# Patient Record
Sex: MALE | Race: BLACK OR AFRICAN AMERICAN | NOT HISPANIC OR LATINO | Employment: UNEMPLOYED | URBAN - METROPOLITAN AREA
[De-identification: names, ages, dates, MRNs, and addresses within clinical notes are randomized per-mention and may not be internally consistent; named-entity substitution may affect disease eponyms.]

---

## 2022-08-05 ENCOUNTER — OFFICE VISIT (OUTPATIENT)
Dept: PRIMARY CARE CLINIC | Facility: CLINIC | Age: 27
End: 2022-08-05
Payer: MEDICAID

## 2022-08-05 VITALS — HEART RATE: 90 BPM | TEMPERATURE: 98 F | OXYGEN SATURATION: 99 %

## 2022-08-05 DIAGNOSIS — Z86.39 HISTORY OF METABOLIC AND NUTRITIONAL DISORDER: ICD-10-CM

## 2022-08-05 DIAGNOSIS — Z00.00 GENERAL MEDICAL EXAM: ICD-10-CM

## 2022-08-05 DIAGNOSIS — G80.1: Primary | ICD-10-CM

## 2022-08-05 DIAGNOSIS — Z13.6 ENCOUNTER FOR LIPID SCREENING FOR CARDIOVASCULAR DISEASE: ICD-10-CM

## 2022-08-05 DIAGNOSIS — M24.569 CONTRACTURE OF JOINT OF LOWER LEG: ICD-10-CM

## 2022-08-05 DIAGNOSIS — Z13.220 ENCOUNTER FOR LIPID SCREENING FOR CARDIOVASCULAR DISEASE: ICD-10-CM

## 2022-08-05 DIAGNOSIS — Q93.82 WILLIAM SYNDROME: ICD-10-CM

## 2022-08-05 PROCEDURE — 99204 OFFICE O/P NEW MOD 45 MIN: CPT | Mod: S$PBB,,, | Performed by: NURSE PRACTITIONER

## 2022-08-05 PROCEDURE — 1159F MED LIST DOCD IN RCRD: CPT | Mod: CPTII,,, | Performed by: NURSE PRACTITIONER

## 2022-08-05 PROCEDURE — 99213 OFFICE O/P EST LOW 20 MIN: CPT | Mod: PBBFAC,PN | Performed by: NURSE PRACTITIONER

## 2022-08-05 PROCEDURE — 99999 PR PBB SHADOW E&M-EST. PATIENT-LVL III: CPT | Mod: PBBFAC,,, | Performed by: NURSE PRACTITIONER

## 2022-08-05 PROCEDURE — 99204 PR OFFICE/OUTPT VISIT, NEW, LEVL IV, 45-59 MIN: ICD-10-PCS | Mod: S$PBB,,, | Performed by: NURSE PRACTITIONER

## 2022-08-05 PROCEDURE — 99999 PR PBB SHADOW E&M-EST. PATIENT-LVL III: ICD-10-PCS | Mod: PBBFAC,,, | Performed by: NURSE PRACTITIONER

## 2022-08-05 PROCEDURE — 1159F PR MEDICATION LIST DOCUMENTED IN MEDICAL RECORD: ICD-10-PCS | Mod: CPTII,,, | Performed by: NURSE PRACTITIONER

## 2022-08-05 RX ORDER — NEOMYCIN SULFATE, POLYMYXIN B SULFATE, HYDROCORTISONE 3.5; 10000; 1 MG/ML; [USP'U]/ML; MG/ML
3 SOLUTION/ DROPS AURICULAR (OTIC)
COMMUNITY
Start: 2022-02-09

## 2022-08-05 RX ORDER — MEDICAL SUPPLY, MISCELLANEOUS
MISCELLANEOUS MISCELLANEOUS
COMMUNITY
Start: 2022-05-12

## 2022-08-05 RX ORDER — MUPIROCIN 20 MG/G
OINTMENT TOPICAL 3 TIMES DAILY
COMMUNITY
Start: 2022-06-06

## 2022-08-05 RX ORDER — LIDOCAINE 50 MG/G
OINTMENT TOPICAL
COMMUNITY
Start: 2022-02-09

## 2022-08-05 RX ORDER — NEOMYCIN SULFATE, POLYMYXIN B SULFATE, HYDROCORTISONE 3.5; 10000; 1 MG/ML; [USP'U]/ML; MG/ML
SOLUTION/ DROPS AURICULAR (OTIC)
COMMUNITY
Start: 2022-02-09

## 2022-08-05 RX ORDER — HYDROCORTISONE 25 MG/ML
LOTION TOPICAL 2 TIMES DAILY
COMMUNITY
Start: 2022-02-09 | End: 2023-02-09

## 2022-08-05 RX ORDER — HYDROCORTISONE 25 MG/ML
1 LOTION TOPICAL 2 TIMES DAILY
COMMUNITY
Start: 2022-05-04 | End: 2023-06-22 | Stop reason: SDUPTHER
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
## 2022-08-08 ENCOUNTER — PATIENT OUTREACH (OUTPATIENT)
Dept: ADMINISTRATIVE | Facility: OTHER | Age: 27
End: 2022-08-08
Payer: MEDICAID

## 2022-08-08 NOTE — PROGRESS NOTES
CHW - Initial Contact    This Community Health Worker completed OR updated the Social Determinant of Health questionnaire with [id number] via telephone today.    Pt identified barriers of most importance are Please see SDOH icons.  Referrals to community agencies completed with caregiver (Kacy Doyle) consent outside of [facility] include: SNAP and DME for a wheelchair.  Referrals were put through [facility] - No  Support and Services: SNAP and Wheelchair.  Other information discussed the patient needs help with mom (Kacy Yanira) none at this time.   Follow up required: Yes  Follow-up Outreach - Due: 8/22/2022

## 2022-08-09 ENCOUNTER — CLINICAL SUPPORT (OUTPATIENT)
Dept: REHABILITATION | Facility: HOSPITAL | Age: 27
End: 2022-08-09
Payer: MEDICAID

## 2022-08-09 DIAGNOSIS — G82.50 SPASTIC QUADRIPLEGIA: ICD-10-CM

## 2022-08-09 DIAGNOSIS — M24.569 CONTRACTURE OF JOINT OF LOWER LEG: ICD-10-CM

## 2022-08-09 PROCEDURE — 97161 PT EVAL LOW COMPLEX 20 MIN: CPT

## 2022-08-11 PROBLEM — G82.50 SPASTIC QUADRIPLEGIA: Status: ACTIVE | Noted: 2022-08-11
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
## 2022-08-30 ENCOUNTER — PATIENT OUTREACH (OUTPATIENT)
Dept: ADMINISTRATIVE | Facility: OTHER | Age: 27
End: 2022-08-30
Payer: MEDICAID

## 2022-08-30 NOTE — PROGRESS NOTES
CHW - Case Closure    This Community Health Worker spoke to caregiver (Drea) via telephone today.   Caregiver reported: She did not fill out for SNAP yet. And she will reach out to the  To get a referral to ortho.  Caregiver denied any additional needs at this time and agrees with episode closure at this time.  Provided caregiver with Community Health Worker's contact information and encouraged her to contact this Community Health Worker if additional needs arise.

## 2022-09-28 ENCOUNTER — TELEPHONE (OUTPATIENT)
Dept: PRIMARY CARE CLINIC | Facility: CLINIC | Age: 27
End: 2022-09-28
Payer: MEDICAID

## 2022-09-28 NOTE — TELEPHONE ENCOUNTER
----- Message from Cammy Enciso sent at 9/28/2022 12:56 PM CDT -----  Contact: Kina / mother [phone]  Patient would like to get a referral.  Referral to what specialty:  Orthopedics Doctor  Does the patient want the referral with a specific physician:  no  [facility]  Reason (be specific):  cerebral palsy, can not get into a seated position at all  Does the patient already have the specialty clinic appointment scheduled:  no  If yes, what date is the appointment scheduled:     Is the insurance listed in Epic correct? (this is important for a referral):  yes  Advised patient that once provider approves this either a nurse or  will return their call?:   Would the patient like a call back, or a response through their [facility] portal?:   Call back  Comments:     Please call and advise.    Thank You

## 2022-10-03 ENCOUNTER — TELEPHONE (OUTPATIENT)
Dept: PRIMARY CARE CLINIC | Facility: CLINIC | Age: 27
End: 2022-10-03
Payer: MEDICAID

## 2022-10-03 DIAGNOSIS — M24.569 CONTRACTURE OF JOINT OF LOWER LEG: ICD-10-CM

## 2022-10-03 DIAGNOSIS — G80.1: Primary | ICD-10-CM

## 2022-10-03 DIAGNOSIS — Q93.82 WILLIAM SYNDROME: ICD-10-CM

## 2022-10-03 NOTE — TELEPHONE ENCOUNTER
Spoke with Mother, Belinda regarding Ortho referral informed mother that referral has been placed and ready for scheduling Mother voiced understanding

## 2022-10-03 NOTE — TELEPHONE ENCOUNTER
Returned call to Drea, appointment scheduled. She voiced understanding of appointment date time and location.

## 2022-12-19 ENCOUNTER — LAB VISIT (OUTPATIENT)
Dept: LAB | Facility: HOSPITAL | Age: 27
End: 2022-12-19
Attending: NURSE PRACTITIONER
Payer: MEDICAID

## 2022-12-19 ENCOUNTER — OFFICE VISIT (OUTPATIENT)
Dept: PRIMARY CARE CLINIC | Facility: CLINIC | Age: 27
End: 2022-12-19
Payer: MEDICAID

## 2022-12-19 VITALS
HEART RATE: 74 BPM | SYSTOLIC BLOOD PRESSURE: 102 MMHG | DIASTOLIC BLOOD PRESSURE: 72 MMHG | WEIGHT: 45 LBS | OXYGEN SATURATION: 97 % | TEMPERATURE: 97 F

## 2022-12-19 DIAGNOSIS — G80.1: Primary | ICD-10-CM

## 2022-12-19 DIAGNOSIS — E03.9 HYPOTHYROIDISM, UNSPECIFIED TYPE: ICD-10-CM

## 2022-12-19 DIAGNOSIS — Z13.6 ENCOUNTER FOR LIPID SCREENING FOR CARDIOVASCULAR DISEASE: ICD-10-CM

## 2022-12-19 DIAGNOSIS — Z97.8 USES FEEDING TUBE: ICD-10-CM

## 2022-12-19 DIAGNOSIS — Z13.220 ENCOUNTER FOR LIPID SCREENING FOR CARDIOVASCULAR DISEASE: ICD-10-CM

## 2022-12-19 DIAGNOSIS — M25.559 HIP PAIN: ICD-10-CM

## 2022-12-19 DIAGNOSIS — Z00.00 GENERAL MEDICAL EXAM: ICD-10-CM

## 2022-12-19 DIAGNOSIS — Q93.82 WILLIAM SYNDROME: ICD-10-CM

## 2022-12-19 LAB
ALBUMIN SERPL BCP-MCNC: 4.4 G/DL (ref 3.5–5.2)
ALP SERPL-CCNC: 88 U/L (ref 55–135)
ALT SERPL W/O P-5'-P-CCNC: 29 U/L (ref 10–44)
ANION GAP SERPL CALC-SCNC: 14 MMOL/L (ref 8–16)
AST SERPL-CCNC: 35 U/L (ref 10–40)
BASOPHILS # BLD AUTO: 0.04 K/UL (ref 0–0.2)
BASOPHILS NFR BLD: 0.7 % (ref 0–1.9)
BILIRUB SERPL-MCNC: 1.1 MG/DL (ref 0.1–1)
BUN SERPL-MCNC: 16 MG/DL (ref 6–20)
CALCIUM SERPL-MCNC: 9.9 MG/DL (ref 8.7–10.5)
CHLORIDE SERPL-SCNC: 101 MMOL/L (ref 95–110)
CHOLEST SERPL-MCNC: 140 MG/DL (ref 120–199)
CHOLEST/HDLC SERPL: 2.2 {RATIO} (ref 2–5)
CO2 SERPL-SCNC: 24 MMOL/L (ref 23–29)
CREAT SERPL-MCNC: 0.6 MG/DL (ref 0.5–1.4)
DIFFERENTIAL METHOD: ABNORMAL
EOSINOPHIL # BLD AUTO: 0.5 K/UL (ref 0–0.5)
EOSINOPHIL NFR BLD: 7.3 % (ref 0–8)
ERYTHROCYTE [DISTWIDTH] IN BLOOD BY AUTOMATED COUNT: 12.8 % (ref 11.5–14.5)
EST. GFR  (NO RACE VARIABLE): >60 ML/MIN/1.73 M^2
GLUCOSE SERPL-MCNC: 87 MG/DL (ref 70–110)
HCT VFR BLD AUTO: 50.8 % (ref 40–54)
HDLC SERPL-MCNC: 64 MG/DL (ref 40–75)
HDLC SERPL: 45.7 % (ref 20–50)
HGB BLD-MCNC: 15.4 G/DL (ref 14–18)
IMM GRANULOCYTES # BLD AUTO: 0 K/UL (ref 0–0.04)
IMM GRANULOCYTES NFR BLD AUTO: 0 % (ref 0–0.5)
LDLC SERPL CALC-MCNC: 67 MG/DL (ref 63–159)
LYMPHOCYTES # BLD AUTO: 2.9 K/UL (ref 1–4.8)
LYMPHOCYTES NFR BLD: 48 % (ref 18–48)
MCH RBC QN AUTO: 27.7 PG (ref 27–31)
MCHC RBC AUTO-ENTMCNC: 30.3 G/DL (ref 32–36)
MCV RBC AUTO: 92 FL (ref 82–98)
MONOCYTES # BLD AUTO: 0.9 K/UL (ref 0.3–1)
MONOCYTES NFR BLD: 14.4 % (ref 4–15)
NEUTROPHILS # BLD AUTO: 1.8 K/UL (ref 1.8–7.7)
NEUTROPHILS NFR BLD: 29.6 % (ref 38–73)
NONHDLC SERPL-MCNC: 76 MG/DL
NRBC BLD-RTO: 0 /100 WBC
PLATELET # BLD AUTO: 200 K/UL (ref 150–450)
PMV BLD AUTO: 13.8 FL (ref 9.2–12.9)
POTASSIUM SERPL-SCNC: 4.4 MMOL/L (ref 3.5–5.1)
PROT SERPL-MCNC: 8.8 G/DL (ref 6–8.4)
RBC # BLD AUTO: 5.55 M/UL (ref 4.6–6.2)
SODIUM SERPL-SCNC: 139 MMOL/L (ref 136–145)
T3 SERPL-MCNC: 93 NG/DL (ref 60–180)
T4 FREE SERPL-MCNC: 1.01 NG/DL (ref 0.71–1.51)
TRIGL SERPL-MCNC: 45 MG/DL (ref 30–150)
TSH SERPL DL<=0.005 MIU/L-ACNC: 1.74 UIU/ML (ref 0.4–4)
WBC # BLD AUTO: 6.13 K/UL (ref 3.9–12.7)

## 2022-12-19 PROCEDURE — 86376 MICROSOMAL ANTIBODY EACH: CPT | Performed by: NURSE PRACTITIONER

## 2022-12-19 PROCEDURE — 80053 COMPREHEN METABOLIC PANEL: CPT | Performed by: NURSE PRACTITIONER

## 2022-12-19 PROCEDURE — 99999 PR PBB SHADOW E&M-EST. PATIENT-LVL IV: CPT | Mod: PBBFAC,,, | Performed by: NURSE PRACTITIONER

## 2022-12-19 PROCEDURE — 84439 ASSAY OF FREE THYROXINE: CPT | Performed by: NURSE PRACTITIONER

## 2022-12-19 PROCEDURE — 99214 OFFICE O/P EST MOD 30 MIN: CPT | Mod: PBBFAC,PN | Performed by: NURSE PRACTITIONER

## 2022-12-19 PROCEDURE — 3074F PR MOST RECENT SYSTOLIC BLOOD PRESSURE < 130 MM HG: ICD-10-PCS | Mod: CPTII,,, | Performed by: NURSE PRACTITIONER

## 2022-12-19 PROCEDURE — 3078F DIAST BP <80 MM HG: CPT | Mod: CPTII,,, | Performed by: NURSE PRACTITIONER

## 2022-12-19 PROCEDURE — 1159F MED LIST DOCD IN RCRD: CPT | Mod: CPTII,,, | Performed by: NURSE PRACTITIONER

## 2022-12-19 PROCEDURE — 3074F SYST BP LT 130 MM HG: CPT | Mod: CPTII,,, | Performed by: NURSE PRACTITIONER

## 2022-12-19 PROCEDURE — 85025 COMPLETE CBC W/AUTO DIFF WBC: CPT | Performed by: NURSE PRACTITIONER

## 2022-12-19 PROCEDURE — 99999 PR PBB SHADOW E&M-EST. PATIENT-LVL IV: ICD-10-PCS | Mod: PBBFAC,,, | Performed by: NURSE PRACTITIONER

## 2022-12-19 PROCEDURE — 84443 ASSAY THYROID STIM HORMONE: CPT | Performed by: NURSE PRACTITIONER

## 2022-12-19 PROCEDURE — 36415 COLL VENOUS BLD VENIPUNCTURE: CPT | Mod: PN | Performed by: NURSE PRACTITIONER

## 2022-12-19 PROCEDURE — 84480 ASSAY TRIIODOTHYRONINE (T3): CPT | Performed by: NURSE PRACTITIONER

## 2022-12-19 PROCEDURE — 99214 OFFICE O/P EST MOD 30 MIN: CPT | Mod: S$PBB,,, | Performed by: NURSE PRACTITIONER

## 2022-12-19 PROCEDURE — 80061 LIPID PANEL: CPT | Performed by: NURSE PRACTITIONER

## 2022-12-19 PROCEDURE — 1159F PR MEDICATION LIST DOCUMENTED IN MEDICAL RECORD: ICD-10-PCS | Mod: CPTII,,, | Performed by: NURSE PRACTITIONER

## 2022-12-19 PROCEDURE — 3078F PR MOST RECENT DIASTOLIC BLOOD PRESSURE < 80 MM HG: ICD-10-PCS | Mod: CPTII,,, | Performed by: NURSE PRACTITIONER

## 2022-12-19 PROCEDURE — 99214 PR OFFICE/OUTPT VISIT, EST, LEVL IV, 30-39 MIN: ICD-10-PCS | Mod: S$PBB,,, | Performed by: NURSE PRACTITIONER

## 2022-12-19 NOTE — PROGRESS NOTES
Subjective:       Patient ID: Stas Munoz is a 27 y.o. male.    Chief Complaint: Follow-up (Referral Ortho )      History of Present Illness:   Stas Munoz 27 y.o. male presents today for follow up bilateral hip pain and contracture. According to mom patient has had several surgeries to try to correct issue but she still can not sit him up in a wheelchair. Patient has to be carried around by younger brother. Mom reports that no one has reached out to her since previous visit for appointment. Will resubmit the referral. Treatment options and alternatives were discussed with the patient. Patient provided opportunity to ask additional questions.  All questions were answered. Voices understanding and acceptance of this advice. Instructed to call back if any further questions or concerns.      Social History     Socioeconomic History    Marital status: Single   Tobacco Use    Smoking status: Never    Smokeless tobacco: Never   Substance and Sexual Activity    Alcohol use: Never    Drug use: Never    Sexual activity: Never     Social Determinants of Health     Financial Resource Strain: Low Risk     Difficulty of Paying Living Expenses: Not hard at all   Food Insecurity: No Food Insecurity    Worried About Running Out of Food in the Last Year: Never true    Ran Out of Food in the Last Year: Never true   Transportation Needs: No Transportation Needs    Lack of Transportation (Medical): No    Lack of Transportation (Non-Medical): No   Physical Activity: Inactive    Days of Exercise per Week: 0 days    Minutes of Exercise per Session: 0 min   Stress: No Stress Concern Present    Feeling of Stress : Not at all   Social Connections: Unknown    Frequency of Communication with Friends and Family: More than three times a week    Frequency of Social Gatherings with Friends and Family: More than three times a week    Active Member of Clubs or Organizations: No    Attends Club or Organization Meetings: Never    Marital Status:  Never    Housing Stability: High Risk    Unable to Pay for Housing in the Last Year: Yes    Number of Places Lived in the Last Year: 1    Unstable Housing in the Last Year: No     Outpatient Encounter Medications as of 12/19/2022   Medication Sig Dispense Refill    hydrocortisone 2.5 % lotion Apply topically 2 (two) times daily.      hydrocortisone 2.5 % lotion 1 application 2 (two) times daily. Apply to affected area      LIDOcaine (XYLOCAINE) 5 % Oint ointment Apply topically.      miscellaneous medical supply (C-TUB) [facility] [place] Fax [phone] Provide Zackery Button size 1.2 cm length. One button to be changed every 6 months. Attention: SULMA.      mupirocin (BACTROBAN) 2 % ointment Apply topically 3 (three) times daily.      neomycin-polymyxin-hydrocortisone (CORTISPORIN) otic solution SMARTSIG:Left Ear      neomycin-polymyxin-hydrocortisone (CORTISPORIN) otic solution Place 3 drops in ear(s).       No facility-administered encounter medications on file as of 12/19/2022.       Review of Systems   Constitutional:  Negative for appetite change, chills and fever.   HENT:  Negative for ear pain, sinus pressure, sore throat and trouble swallowing.    Eyes:  Negative for visual disturbance.   Respiratory:  Negative for shortness of breath.    Cardiovascular:  Negative for chest pain.   Gastrointestinal:  Negative for abdominal pain, diarrhea, nausea and vomiting.   Endocrine: Negative for cold intolerance, polyphagia and polyuria.   Genitourinary:  Negative for decreased urine volume and dysuria.   Musculoskeletal:  Positive for arthralgias (spastic). Negative for back pain.   Skin:  Negative for rash.   Allergic/Immunologic: Negative for environmental allergies and food allergies.   Neurological:  Negative for dizziness, tremors, weakness and numbness.   Hematological:  Does not bruise/bleed easily.   Psychiatric/Behavioral:  Negative for confusion and hallucinations. The patient is not nervous/anxious and is not  hyperactive.    All other systems reviewed and are negative.    Objective:      /72 (BP Location: Left arm, Patient Position: Sitting, BP Method: Medium (Manual))   Pulse 74   Temp 97 °F (36.1 °C) (Temporal)   Wt 20.4 kg (45 lb)   SpO2 97%   Physical Exam  Constitutional:       General: He is not in acute distress.     Appearance: He is not ill-appearing, toxic-appearing or diaphoretic.   Pulmonary:      Effort: Pulmonary effort is normal.      Breath sounds: Normal breath sounds.   Abdominal:      General: Bowel sounds are normal.      Palpations: Abdomen is soft.   Musculoskeletal:      Right hip: Decreased range of motion. Decreased strength.      Left hip: Decreased range of motion. Decreased strength.      Right upper leg: Deformity present.      Left upper leg: Deformity present.       No results found for this or any previous visit.  Assessment:       1. Spastic cerebral palsy, congenital    2. Jhonathan syndrome    3. Hypothyroidism, unspecified type    4. General medical exam          Plan:   Stas was seen today for follow-up.    Diagnoses and all orders for this visit:    Spastic cerebral palsy, congenital  -     Ambulatory referral/consult to Orthopedics; Future    Jhonathan syndrome    Hypothyroidism, unspecified type  -     TSH; Future  -     T3; Future  -     T4, Free; Future  -     Thyroid Peroxidase Antibody; Future    General medical exam  -     CBC Auto Differential; Future  -     Comprehensive Metabolic Panel; Future    Hip pain  -     Ambulatory referral/consult to Neurology; Future    Encounter for lipid screening for cardiovascular disease  -     Lipid Panel; Future    Uses feeding tube  -     Ambulatory referral/consult to Gastroenterology; Future              [facility]   [address] [place] [address] [address]  [place] [address]  Office [phone]  Fax [phone]

## 2022-12-20 LAB — THYROPEROXIDASE IGG SERPL-ACNC: <6 IU/ML

## 2022-12-21 ENCOUNTER — TELEPHONE (OUTPATIENT)
Dept: PRIMARY CARE CLINIC | Facility: CLINIC | Age: 27
End: 2022-12-21
Payer: MEDICAID

## 2022-12-21 NOTE — TELEPHONE ENCOUNTER
Patient mother called regarding patient labs. Patient mother informed of results. Patient mother voiced understanding.  ----- Message from César Paulino DNP sent at 12/21/2022 12:37 PM CST -----  Please contact the patient and let them know that their results were fine and do not require any change in treatment.

## 2023-01-23 ENCOUNTER — TELEPHONE (OUTPATIENT)
Dept: PRIMARY CARE CLINIC | Facility: CLINIC | Age: 28
End: 2023-01-23
Payer: MEDICAID

## 2023-01-23 NOTE — TELEPHONE ENCOUNTER
I returned the patient's call in reference to his referral for Ortho, the Order is in and ready for scheduling, patient was also informed to contact the Ortho for scheduling. Patient Mother Drea voiced her understanding and was given the scheduling information.

## 2023-06-16 ENCOUNTER — TELEPHONE (OUTPATIENT)
Dept: PRIMARY CARE CLINIC | Facility: CLINIC | Age: 28
End: 2023-06-16
Payer: MEDICAID

## 2023-06-16 NOTE — TELEPHONE ENCOUNTER
Patient called regarding request for 90 L form completion spoke with Kacy, mother informed her that patient is in need of follow up with labs, educated patient on form completion and turn around time, advised patient to contact insurance provider and get information on whose in network and get the names and contact information of providers. Patient given appt date and time

## 2023-06-22 ENCOUNTER — OFFICE VISIT (OUTPATIENT)
Dept: PRIMARY CARE CLINIC | Facility: CLINIC | Age: 28
End: 2023-06-22
Payer: MEDICAID

## 2023-06-22 ENCOUNTER — LAB VISIT (OUTPATIENT)
Dept: LAB | Facility: HOSPITAL | Age: 28
End: 2023-06-22
Attending: FAMILY MEDICINE
Payer: MEDICAID

## 2023-06-22 ENCOUNTER — PATIENT OUTREACH (OUTPATIENT)
Dept: ADMINISTRATIVE | Facility: OTHER | Age: 28
End: 2023-06-22
Payer: MEDICAID

## 2023-06-22 DIAGNOSIS — E03.9 HYPOTHYROIDISM, UNSPECIFIED TYPE: ICD-10-CM

## 2023-06-22 DIAGNOSIS — Z97.8 USES FEEDING TUBE: ICD-10-CM

## 2023-06-22 DIAGNOSIS — R47.01 NONVERBAL: ICD-10-CM

## 2023-06-22 DIAGNOSIS — M24.569 CONTRACTURE OF JOINT OF LOWER LEG: ICD-10-CM

## 2023-06-22 DIAGNOSIS — Z86.39 HISTORY OF METABOLIC AND NUTRITIONAL DISORDER: ICD-10-CM

## 2023-06-22 DIAGNOSIS — G82.50 SPASTIC QUADRIPLEGIA: Primary | ICD-10-CM

## 2023-06-22 DIAGNOSIS — G80.1: ICD-10-CM

## 2023-06-22 DIAGNOSIS — L20.89 OTHER ATOPIC DERMATITIS: ICD-10-CM

## 2023-06-22 DIAGNOSIS — K21.9 GASTROESOPHAGEAL REFLUX DISEASE WITHOUT ESOPHAGITIS: ICD-10-CM

## 2023-06-22 DIAGNOSIS — J30.9 CHRONIC ALLERGIC RHINITIS: ICD-10-CM

## 2023-06-22 DIAGNOSIS — Q93.82 WILLIAM SYNDROME: ICD-10-CM

## 2023-06-22 LAB — TSH SERPL DL<=0.005 MIU/L-ACNC: 2.44 UIU/ML (ref 0.4–4)

## 2023-06-22 PROCEDURE — 99999 PR PBB SHADOW E&M-EST. PATIENT-LVL IV: ICD-10-PCS | Mod: PBBFAC,,, | Performed by: FAMILY MEDICINE

## 2023-06-22 PROCEDURE — 99215 PR OFFICE/OUTPT VISIT, EST, LEVL V, 40-54 MIN: ICD-10-PCS | Mod: S$PBB,,, | Performed by: FAMILY MEDICINE

## 2023-06-22 PROCEDURE — 99215 OFFICE O/P EST HI 40 MIN: CPT | Mod: S$PBB,,, | Performed by: FAMILY MEDICINE

## 2023-06-22 PROCEDURE — 1159F PR MEDICATION LIST DOCUMENTED IN MEDICAL RECORD: ICD-10-PCS | Mod: CPTII,,, | Performed by: FAMILY MEDICINE

## 2023-06-22 PROCEDURE — 84443 ASSAY THYROID STIM HORMONE: CPT | Performed by: FAMILY MEDICINE

## 2023-06-22 PROCEDURE — 36415 COLL VENOUS BLD VENIPUNCTURE: CPT | Mod: PN | Performed by: FAMILY MEDICINE

## 2023-06-22 PROCEDURE — 1159F MED LIST DOCD IN RCRD: CPT | Mod: CPTII,,, | Performed by: FAMILY MEDICINE

## 2023-06-22 PROCEDURE — 99214 OFFICE O/P EST MOD 30 MIN: CPT | Mod: PBBFAC,PN | Performed by: FAMILY MEDICINE

## 2023-06-22 PROCEDURE — 99999 PR PBB SHADOW E&M-EST. PATIENT-LVL IV: CPT | Mod: PBBFAC,,, | Performed by: FAMILY MEDICINE

## 2023-06-22 RX ORDER — BACLOFEN 5 MG/ML
10 SUSPENSION ORAL EVERY 8 HOURS PRN
Qty: 300 ML | Refills: 2 | Status: SHIPPED | OUTPATIENT
Start: 2023-06-22 | End: 2023-06-29

## 2023-06-22 RX ORDER — FAMOTIDINE 40 MG/5ML
20 POWDER, FOR SUSPENSION ORAL 2 TIMES DAILY PRN
Qty: 50 ML | Refills: 2 | Status: SHIPPED | OUTPATIENT
Start: 2023-06-22 | End: 2023-07-22

## 2023-06-22 RX ORDER — CETIRIZINE HYDROCHLORIDE 1 MG/ML
10 SOLUTION ORAL DAILY
Qty: 473 ML | Refills: 5 | Status: SHIPPED | OUTPATIENT
Start: 2023-06-22 | End: 2024-06-21

## 2023-06-22 RX ORDER — HYDROCORTISONE 25 MG/ML
LOTION TOPICAL 2 TIMES DAILY
Qty: 118 ML | Refills: 11 | Status: SHIPPED | OUTPATIENT
Start: 2023-06-22 | End: 2023-07-22

## 2023-06-22 RX ORDER — TRIAMCINOLONE ACETONIDE 1 MG/G
CREAM TOPICAL 2 TIMES DAILY PRN
Qty: 453.6 G | Refills: 0 | Status: SHIPPED | OUTPATIENT
Start: 2023-06-22 | End: 2024-06-21

## 2023-06-22 NOTE — PROGRESS NOTES
Subjective:       Patient ID: Stas Munoz is a 27 y.o. male.    Chief Complaint: Annual Exam      History of Present Illness:   Stas Munoz 27 y.o. male presents today with     Total care, brought in by his mother and aunt who takes car eof him during the day. In bed, writhing and the mother had to hold him down.   Requesting 90 L forms to be filled,  22. Spoke to  too.  Gravely disabled, with Spastic CP looking for contracture release to the knees so he is able to sit in a wheelchair. He has been waiting for years and getting too heavy for the mother who has to carry him around. Ortho referral has been done several times and the mother is sound ing frustrated today.  Epic shows hypothyroidism diagnosis, mother verified that he used to be on levothyroxine.  Asking for refill on topical steroid for eczema and zyrtec for his allergies.   History reviewed. No pertinent past medical history.  History reviewed. No pertinent family history.  Social History     Socioeconomic History    Marital status: Single   Tobacco Use    Smoking status: Never    Smokeless tobacco: Never   Substance and Sexual Activity    Alcohol use: Never    Drug use: Never    Sexual activity: Never     Social Determinants of Health     Financial Resource Strain: Low Risk     Difficulty of Paying Living Expenses: Not hard at all   Food Insecurity: No Food Insecurity    Worried About Running Out of Food in the Last Year: Never true    Ran Out of Food in the Last Year: Never true   Transportation Needs: No Transportation Needs    Lack of Transportation (Medical): No    Lack of Transportation (Non-Medical): No   Physical Activity: Inactive    Days of Exercise per Week: 0 days    Minutes of Exercise per Session: 0 min   Stress: No Stress Concern Present    Feeling of Stress : Not at all   Social Connections: Socially Isolated    Frequency of Communication with Friends and Family: More than three times a week    Frequency of  Social Gatherings with Friends and Family: More than three times a week    Attends Denominational Services: Never    Active Member of Clubs or Organizations: No    Attends Club or Organization Meetings: Never    Marital Status: Never    Housing Stability: Low Risk     Unable to Pay for Housing in the Last Year: No    Number of Places Lived in the Last Year: 1    Unstable Housing in the Last Year: No     Outpatient Encounter Medications as of 6/22/2023   Medication Sig Dispense Refill    LIDOcaine (XYLOCAINE) 5 % Oint ointment Apply topically.      misc[place] medical [place]UB[place] [facility] [phone] Provide Zackery Button size 1.2 cm length. One button to be changed every 6 months. Attention: SULMA.      mupirocin (BACTROBAN) 2 % ointment Apply topically 3 (three) times daily.      neomycin-polymyxin-hydrocortisone (CORTISPORIN) otic solution SMARTSIG:Left Ear      neomycin-polymyxin-hydrocortisone (CORTISPORIN) otic solution Place 3 drops in ear(s).      [DISCONTINUED] hydrocortisone 2.5 % lotion 1 application 2 (two) times daily. Apply to affected area      baclofen 25 mg/5 mL (5 mg/mL) Susp oral liquid Take 2 mLs (10 mg total) by mouth every 8 (eight) hours as needed (spasm). 300 mL 2    cetirizine (ZYRTEC) 1 mg/mL syrup Take 10 mLs (10 mg total) by mouth once daily. 473 mL 5    famotidine (PEPCID) 40 mg/5 mL (8 mg/mL) suspension Take 2.5 mLs (20 mg total) by mouth 2 (two) times daily as needed (acid reflux). 50 mL 2    hydrocortisone 2.5 % lotion Apply topically 2 (two) times daily. Apply to affected area 118 mL 11    triamcinolone acetonide 0.1% (KENALOG) 0.1 % cream Apply topically 2 (two) times daily as needed. 453.6 g 0     No facility-administered encounter medications on file as of 6/22/2023.       Review of Systems   Unable to perform ROS: Acuity of condition            Objective:      BP (P) 114/81 (BP Location: Left arm, Patient Position: Sitting, BP Method: Pediatric (Automatic))   Pulse (!)  (P) 111   Temp (P) 97.9 °F (36.6 °C) (Temporal)   Physical Exam  Constitutional:       Comments: Eyes open, writhing   Abdominal:      Tenderness: There is no abdominal tenderness.          Comments: LUQ peg tube in place   Skin:     Findings: Rash present. Rash is macular and papular.         Results for orders placed or performed in visit on 12/19/22   TSH   Result Value Ref Range    TSH 1.737 0.400 - 4.000 uIU/mL   T3   Result Value Ref Range    T3, Total 93 60 - 180 ng/dL   T4, Free   Result Value Ref Range    Free T4 1.01 0.71 - 1.51 ng/dL   Thyroid Peroxidase Antibody   Result Value Ref Range    Thyroperoxidase Antibodies <6.0 <6.0 IU/mL   CBC Auto Differential   Result Value Ref Range    WBC 6.13 3.90 - 12.70 K/uL    RBC 5.55 4.60 - 6.20 M/uL    Hemoglobin 15.4 14.0 - 18.0 g/dL    Hematocrit 50.8 40.0 - 54.0 %    MCV 92 82 - 98 fL    MCH 27.7 27.0 - 31.0 pg    MCHC 30.3 (L) 32.0 - 36.0 g/dL    RDW 12.8 11.5 - 14.5 %    Platelets 200 150 - 450 K/uL    MPV 13.8 (H) 9.2 - 12.9 fL    Immature Granulocytes 0.0 0.0 - 0.5 %    Gran # (ANC) 1.8 1.8 - 7.7 K/uL    Immature Grans (Abs) 0.00 0.00 - 0.04 K/uL    Lymph # 2.9 1.0 - 4.8 K/uL    Mono # 0.9 0.3 - 1.0 K/uL    Eos # 0.5 0.0 - 0.5 K/uL    Baso # 0.04 0.00 - 0.20 K/uL    nRBC 0 0 /100 WBC    Gran % 29.6 (L) 38.0 - 73.0 %    Lymph % 48.0 18.0 - 48.0 %    Mono % 14.4 4.0 - 15.0 %    Eosinophil % 7.3 0.0 - 8.0 %    Basophil % 0.7 0.0 - 1.9 %    Differential Method Automated    Comprehensive Metabolic Panel   Result Value Ref Range    Sodium 139 136 - 145 mmol/L    Potassium 4.4 3.5 - 5.1 mmol/L    Chloride 101 95 - 110 mmol/L    CO2 24 23 - 29 mmol/L    Glucose 87 70 - 110 mg/dL    BUN 16 6 - 20 mg/dL    Creatinine 0.6 0.5 - 1.4 mg/dL    Calcium 9.9 8.7 - 10.5 mg/dL    Total Protein 8.8 (H) 6.0 - 8.4 g/dL    Albumin 4.4 3.5 - 5.2 g/dL    Total Bilirubin 1.1 (H) 0.1 - 1.0 mg/dL    Alkaline Phosphatase 88 55 - 135 U/L    AST 35 10 - 40 U/L    ALT 29 10 - 44 U/L     Anion Gap 14 8 - 16 mmol/L    eGFR >60.0 >60 mL/min/1.73 m^2   Lipid Panel   Result Value Ref Range    Cholesterol 140 120 - 199 mg/dL    Triglycerides 45 30 - 150 mg/dL    HDL 64 40 - 75 mg/dL    LDL Cholesterol 67.0 63.0 - 159.0 mg/dL    HDL/Cholesterol Ratio 45.7 20.0 - 50.0 %    Total Cholesterol/HDL Ratio 2.2 2.0 - 5.0    Non-HDL Cholesterol 76 mg/dL     Assessment:       1. Spastic quadriplegia    2. Spastic cerebral palsy, congenital    3. Jhonathan syndrome    4. Uses feeding tube    5. History of metabolic and nutritional disorder    6. Contracture of joint of lower leg    7. Hypothyroidism, unspecified type    8. Other atopic dermatitis    9. Chronic allergic rhinitis    10. Gastroesophageal reflux disease without esophagitis    11. Nonverbal        Plan:   Spastic quadriplegia  -     Ambulatory referral/consult to Orthopedics; Future; Expected date: 06/29/2023  -     baclofen 25 mg/5 mL (5 mg/mL) Susp oral liquid; Take 2 mLs (10 mg total) by mouth every 8 (eight) hours as needed (spasm).  Dispense: 300 mL; Refill: 2  -     Ambulatory referral/consult to Home Health; Future; Expected date: 06/23/2023    Spastic cerebral palsy, congenital  -     Ambulatory referral/consult to Orthopedics; Future; Expected date: 06/29/2023  -     Ambulatory referral/consult to Home Health; Future; Expected date: 06/23/2023    Jhonathan syndrome    Uses feeding tube    History of metabolic and nutritional disorder    Contracture of joint of lower leg  -     Ambulatory referral/consult to Orthopedics; Future; Expected date: 06/29/2023    Hypothyroidism, unspecified type  -     TSH; Future; Expected date: 06/22/2023    Other atopic dermatitis  -     hydrocortisone 2.5 % lotion; Apply topically 2 (two) times daily. Apply to affected area  Dispense: 118 mL; Refill: 11  -     triamcinolone acetonide 0.1% (KENALOG) 0.1 % cream; Apply topically 2 (two) times daily as needed.  Dispense: 453.6 g; Refill: 0    Chronic allergic rhinitis  -      cetirizine (ZYRTEC) 1 mg/mL syrup; Take 10 mLs (10 mg total) by mouth once daily.  Dispense: 473 mL; Refill: 5    Gastroesophageal reflux disease without esophagitis  Comments:  presumed  Orders:  -     famotidine (PEPCID) 40 mg/5 mL (8 mg/mL) suspension; Take 2.5 mLs (20 mg total) by mouth 2 (two) times daily as needed (acid reflux).  Dispense: 50 mL; Refill: 2    Nonverbal      90L filled.  Spoke with his .      MEDICAL DECISION MAKING: Moderate to high complexity.  Overall, the multiple problems listed are of moderate to high severity that may impact quality of life and activities of daily living. Side effects of medications, treatment plan as well as options and alternatives reviewed and discussed with patient. There was counseling of patient concerning these issues.  Total time spent in face to face counseling and coordination of care  is 60 minutes   I have reviewed all of the patient's clinical history available in care everywhere and Epic and have utilized this in my evaluation and management recommendations today.     Treatment options and alternatives were discussed with the patient. Patient was given ample time to ask questions. All questions were answered. Voices understanding and acceptance of this advice. Will call back if any further questions or concerns.  Fela Armas MD

## 2023-06-22 NOTE — PROGRESS NOTES
CHW - Initial Contact    This Community Health Worker completed the Social Determinant of Health questionnaire with caregiver during clinic visit today.    Pt identified barriers of most importance are. Patient shared no barriers for resources.   Referrals to community agencies completed with patient consent outside of [facility] include. No outside referrals at this time.  Referrals were put through [facility] - no  Support and Services: None  Other information discussed the patient needs help with. No other information was shared.   Follow up required: Yes  Follow-up Outreach - Due: 6/29/2023

## 2023-06-27 ENCOUNTER — TELEPHONE (OUTPATIENT)
Dept: PRIMARY CARE CLINIC | Facility: CLINIC | Age: 28
End: 2023-06-27
Payer: MEDICAID

## 2023-06-27 ENCOUNTER — PATIENT MESSAGE (OUTPATIENT)
Dept: RESEARCH | Facility: HOSPITAL | Age: 28
End: 2023-06-27
Payer: MEDICAID

## 2023-06-27 DIAGNOSIS — G82.50 SPASTIC QUADRIPLEGIA: ICD-10-CM

## 2023-06-27 NOTE — TELEPHONE ENCOUNTER
Patient mother informed of results. Patient mother voiced understanding.  ----- Message from Fela Armas MD sent at 6/25/2023  9:02 PM CDT -----  Thyroid function is controlled

## 2023-06-28 ENCOUNTER — TELEPHONE (OUTPATIENT)
Dept: ORTHOPEDICS | Facility: CLINIC | Age: 28
End: 2023-06-28
Payer: MEDICAID

## 2023-06-29 RX ORDER — BACLOFEN 5 MG/ML
10 SUSPENSION ORAL EVERY 8 HOURS PRN
Qty: 300 ML | Refills: 2 | Status: SHIPPED | OUTPATIENT
Start: 2023-06-29 | End: 2023-09-27

## 2023-07-03 ENCOUNTER — HOSPITAL ENCOUNTER (OUTPATIENT)
Dept: RADIOLOGY | Facility: HOSPITAL | Age: 28
Discharge: HOME | End: 2023-07-03
Attending: ORTHOPAEDIC SURGERY
Payer: MEDICAID

## 2023-07-03 ENCOUNTER — OFFICE VISIT (OUTPATIENT)
Dept: ORTHOPEDIC SURGERY | Facility: CLINIC | Age: 28
End: 2023-07-03
Payer: MEDICAID

## 2023-07-03 DIAGNOSIS — G80.1: ICD-10-CM

## 2023-07-03 DIAGNOSIS — G82.50 SPASTIC QUADRIPLEGIA: ICD-10-CM

## 2023-07-03 DIAGNOSIS — M24.569 CONTRACTURE OF JOINT OF LOWER LEG: ICD-10-CM

## 2023-07-03 DIAGNOSIS — G82.50 SPASTIC QUADRIPLEGIA: Primary | ICD-10-CM

## 2023-07-03 PROCEDURE — 73560 X-RAY EXAM OF KNEE 1 OR 2: CPT | Mod: 26,RT,, | Performed by: RADIOLOGY

## 2023-07-03 PROCEDURE — 73560 X-RAY EXAM OF KNEE 1 OR 2: CPT | Mod: TC,RT

## 2023-07-03 PROCEDURE — 99999 PR PBB SHADOW E&M-EST. PATIENT-LVL III: CPT | Mod: PBBFAC,,, | Performed by: ORTHOPAEDIC SURGERY

## 2023-07-03 PROCEDURE — 72082 XR SCOLIOSIS COMPLETE: ICD-10-PCS | Mod: 26,,, | Performed by: RADIOLOGY

## 2023-07-03 PROCEDURE — 72190 XR PELVIS COMPLETE MIN 3 VIEWS: ICD-10-PCS | Mod: 26,,, | Performed by: RADIOLOGY

## 2023-07-03 PROCEDURE — 1159F PR MEDICATION LIST DOCUMENTED IN MEDICAL RECORD: ICD-10-PCS | Mod: CPTII,,, | Performed by: ORTHOPAEDIC SURGERY

## 2023-07-03 PROCEDURE — 72082 X-RAY EXAM ENTIRE SPI 2/3 VW: CPT | Mod: 26,,, | Performed by: RADIOLOGY

## 2023-07-03 PROCEDURE — 1160F PR REVIEW ALL MEDS BY PRESCRIBER/CLIN PHARMACIST DOCUMENTED: ICD-10-PCS | Mod: CPTII,,, | Performed by: ORTHOPAEDIC SURGERY

## 2023-07-03 PROCEDURE — 72190 X-RAY EXAM OF PELVIS: CPT | Mod: TC

## 2023-07-03 PROCEDURE — 72082 X-RAY EXAM ENTIRE SPI 2/3 VW: CPT | Mod: TC

## 2023-07-03 PROCEDURE — 1160F RVW MEDS BY RX/DR IN RCRD: CPT | Mod: CPTII,,, | Performed by: ORTHOPAEDIC SURGERY

## 2023-07-03 PROCEDURE — 99204 OFFICE O/P NEW MOD 45 MIN: CPT | Mod: S$PBB,,, | Performed by: ORTHOPAEDIC SURGERY

## 2023-07-03 PROCEDURE — 72190 X-RAY EXAM OF PELVIS: CPT | Mod: 26,,, | Performed by: RADIOLOGY

## 2023-07-03 PROCEDURE — 99204 PR OFFICE/OUTPT VISIT, NEW, LEVL IV, 45-59 MIN: ICD-10-PCS | Mod: S$PBB,,, | Performed by: ORTHOPAEDIC SURGERY

## 2023-07-03 PROCEDURE — 73560 XR KNEE 1 OR 2 VIEW RIGHT: ICD-10-PCS | Mod: 26,RT,, | Performed by: RADIOLOGY

## 2023-07-03 PROCEDURE — 99213 OFFICE O/P EST LOW 20 MIN: CPT | Mod: PBBFAC | Performed by: ORTHOPAEDIC SURGERY

## 2023-07-03 PROCEDURE — 1159F MED LIST DOCD IN RCRD: CPT | Mod: CPTII,,, | Performed by: ORTHOPAEDIC SURGERY

## 2023-07-03 PROCEDURE — 99999 PR PBB SHADOW E&M-EST. PATIENT-LVL III: ICD-10-PCS | Mod: PBBFAC,,, | Performed by: ORTHOPAEDIC SURGERY

## 2023-07-05 ENCOUNTER — PATIENT OUTREACH (OUTPATIENT)
Dept: ADMINISTRATIVE | Facility: OTHER | Age: 28
End: 2023-07-05
Payer: MEDICAID

## 2023-07-05 NOTE — PROGRESS NOTES
CHW - Follow Up    This Community Health Worker completed a follow up visit with patient via telephone today.  Pt reported no new information was reported at this time.  Community Health Worker provided patient with a follow up call to make sure he's doing well.  Follow up required: No  No future outreach task assigned      CHW - Case Closure    This Community Health Worker spoke to patient via telephone today.   Pt reported no new information att his time.  Pt denied any additional needs at this time and agrees with episode closure at this time.  Provided patient with Community Health Worker's contact information and encouraged her to contact this Community Health Worker if additional needs arise.

## 2023-07-05 NOTE — PROGRESS NOTES
[facility]  Pediatric Orthopedic Clinic      Patient ID:   NAME:  Stas Munoz   MRN:  [id number]  DOS:  7/3/2021       Reason for Appointment  Chief Complaint   Patient presents with    Consult       History of Present Illness  Stas is a 27 y.o. male presenting for an initial patient visit for evaluation of his bilateral lower extremity contractures. Mom's overall goal is to improve his seating position so that they may better use a wheelchair for transportation. He carries an underlying diagnosis of quadriplegic CP GMFCS 5. He has had multiple bilateral hip surgeries including bilateral proximal femoral resections. The left hip subsequently developed HO and cannot be moved at this time. He currently does not participate in PT/OT, he does not see physical medicine and rehabilitation, he does take baclofen for his tone management. They are otherwise without complaint today.    Review Of Systems  All systems were reviewed and are negative except as noted in the HPI    The following portions of the patient's history were reviewed and updated as appropriate: allergies, past family history, past medical history, past social history, past surgical history, and problem list.      Examination  There were no vitals filed for this visit.    Constitutional: Alert. Non-verbal, though will make noises  Musculoskeletal:    right lower extremity:  short arch ROM at the hip with mechanical block, minimal abduction present at the hip, knee flexion contracture of 100deg, unable to assess distal motor/sensory 2/2 underyling medical co-morbidities   Left lower extremity: no ROM at the hip, no abduction/adduction, knee flexion contracture of 40 degrees, baseline motor/sensory    Imaging  Radiographs reviewed by me in clinic today from an orthopedic perspective demonstrate post-surgical changes and spine fusion implants without obvious signs of failure or lucency. The left hip demonstrates significant  "heterotopic ossification around the acetabulum and proximal femur. 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 The right proximal femur is not present, there is significant HO around the right hip. The right knee does not demonstrate any acute osseous abnormalities.    Assessments/Plan  Stas is a 27 y.o. male with quad CP and significant lower extremity contractures preventing appropriate seating. I discussed his physical exam with his mother and discussed that I would like to obtain current imaging and depending on that imaging will determine if a CT of the pelvis is warranted. After review of those films a pelvis CT was ordered for preoperative planning. We will plan to see them back in 2 weeks for further treatment discussions.    Follow Up  2 weeks    Total time spent was at least 45 minutes which included obtaining the history of present illness, face-to-face examination, image review, review of previous clinical notes, counseling, and documenting in the medical chart.    Karan Hollins MD, MSc, FAAOS  Pediatric Orthopedic Surgeon, Dept of Orthopedics  [facility]  Phone:  [place]: [phone]  [place]: [phone]     *Portions of this note may have been created with voice recognition software. Occasional "wrong-word" or "sound-a-like" substitutions may have occurred due to the inherent limitations of voice recognition software.  Please, read the note carefully and recognize, using context, where substitutions have occurred.    "

## 2023-07-07 ENCOUNTER — TELEPHONE (OUTPATIENT)
Dept: ORTHOPEDIC SURGERY | Facility: CLINIC | Age: 28
End: 2023-07-07
Payer: MEDICAID

## 2023-08-14 ENCOUNTER — TELEPHONE (OUTPATIENT)
Dept: ORTHOPEDIC SURGERY | Facility: CLINIC | Age: 28
End: 2023-08-14
Payer: MEDICAID

## 2024-05-06 ENCOUNTER — OFFICE VISIT (OUTPATIENT)
Dept: PRIMARY CARE CLINIC | Facility: CLINIC | Age: 29
End: 2024-05-06
Payer: MEDICAID

## 2024-05-06 DIAGNOSIS — G82.50 SPASTIC QUADRIPLEGIA: Primary | ICD-10-CM

## 2024-05-06 DIAGNOSIS — Z02.9 ADMINISTRATIVE ENCOUNTER: ICD-10-CM

## 2024-05-06 DIAGNOSIS — M24.569 CONTRACTURE OF JOINT OF LOWER LEG: ICD-10-CM

## 2024-05-06 DIAGNOSIS — G80.1: ICD-10-CM

## 2024-05-06 PROCEDURE — 1160F RVW MEDS BY RX/DR IN RCRD: CPT | Mod: CPTII,95,, | Performed by: FAMILY MEDICINE

## 2024-05-06 PROCEDURE — 99214 OFFICE O/P EST MOD 30 MIN: CPT | Mod: 95,,, | Performed by: FAMILY MEDICINE

## 2024-05-06 PROCEDURE — 1159F MED LIST DOCD IN RCRD: CPT | Mod: CPTII,95,, | Performed by: FAMILY MEDICINE

## 2024-05-06 NOTE — PROGRESS NOTES
Subjective:    The patient location is: [place] at home  Visit type: Virtual visit with synchronous audio and video  Total time spent with patient:30 mins  This includes face to face time and non-face to face time preparing to see the patient (eg, review of tests), obtaining and/or reviewing separately obtained history, documenting clinical information in the electronic or other health record, independently interpreting results and communicating results to the patient/family/caregiver, or care coordinator.   Each patient to whom he or she provides medical services by telemedicine is:  (1) informed of the relationship between the physician and patient and the respective role of any other health care provider with respect to management of the patient; and (2) notified that he or she may decline to receive medical services by telemedicine and may withdraw from such care at any time.   Patient ID: Stas Munoz is a 28 y.o. male.    Chief Complaint: Follow-up      History of Present Illness:   Stas Munoz 28 y.o. male presents today with Follow-up    Stas Millers allergies, medications, history, and problem list were updated as appropriate.   No past medical history on file.  No family history on file.  Social History     Socioeconomic History    Marital status: Single   Tobacco Use    Smoking status: Never    Smokeless tobacco: Never   Substance and Sexual Activity    Alcohol use: Never    Drug use: Never    Sexual activity: Never     Social Determinants of Health     Financial Resource Strain: Patient Declined (5/6/2024)    Overall Financial Resource Strain (CARDIA)     Difficulty of Paying Living Expenses: Patient declined   Food Insecurity: Patient Declined (5/6/2024)    Hunger Vital Sign     Worried About Running Out of Food in the Last Year: Patient declined     Ran Out of Food in the Last Year: Patient declined   Transportation Needs: Unknown (5/6/2024)    PRAPARE - Transportation     Lack of  Transportation (Medical): Patient declined     Lack of Transportation (Non-Medical): No   Physical Activity: Unknown (5/6/2024)    Exercise Vital Sign     Days of Exercise per Week: Patient declined     Minutes of Exercise per Session: 0 min   Stress: No Stress Concern Present (5/6/2024)    [employer] [place] - Occupational Stress Questionnaire     Feeling of Stress : Not at all   Housing Stability: Unknown (5/6/2024)    Housing Stability Vital Sign     Unable to Pay for Housing in the Last Year: Patient declined     Outpatient Encounter Medications as of 5/6/2024   Medication Sig Dispense Refill    cetirizine (ZYRTEC) 1 mg/mL syrup Take 10 mLs (10 mg total) by mouth once daily. 473 mL 5    famotidine (PEPCID) 40 mg/5 mL (8 mg/mL) suspension Take 2.5 mLs (20 mg total) by mouth 2 (two) times daily as needed (acid reflux). 50 mL 2    hydrocortisone 2.5 % lotion Apply topically 2 (two) times daily. Apply to affected area 118 mL 11    LIDOcaine (XYLOCAINE) 5 % Oint ointment Apply topically.      miscellaneous medical [place] (C-TUB[place] [facility] [place] [phone] Provide Zackery Button size 1.2 cm length. One button to be changed every 6 months. Attention: SULMA.      mupirocin (BACTROBAN) 2 % ointment Apply topically 3 (three) times daily.      neomycin-polymyxin-hydrocortisone (CORTISPORIN) otic solution SMARTSIG:Left Ear      neomycin-polymyxin-hydrocortisone (CORTISPORIN) otic solution Place 3 drops in ear(s).      triamcinolone acetonide 0.1% (KENALOG) 0.1 % cream Apply topically 2 (two) times daily as needed. 453.6 g 0     No facility-administered encounter medications on file as of 5/6/2024.       Review of Systems   Unable to perform ROS: Patient nonverbal   Constitutional:  Negative for activity change and unexpected weight change.   HENT:  Positive for rhinorrhea. Negative for hearing loss and trouble swallowing.    Eyes:  Negative for discharge and visual disturbance.   Respiratory:  Negative for  chest tightness and wheezing.    Cardiovascular:  Negative for chest pain and palpitations.   Gastrointestinal:  Negative for blood in stool, constipation, diarrhea and vomiting.   Endocrine: Negative for polydipsia and polyuria.   Genitourinary:  Negative for difficulty urinating, hematuria and urgency.   Musculoskeletal:  Negative for arthralgias, joint swelling and neck pain.   Neurological:  Negative for weakness and headaches.   Psychiatric/Behavioral:  Negative for confusion and dysphoric mood.        Objective:      There were no vitals taken for this visit.  Physical Exam    Awake, non verbal. In bed  Results for orders placed or performed in visit on 06/22/23   TSH   Result Value Ref Range    TSH 2.435 0.400 - 4.000 uIU/mL     Assessment:       1. Spastic quadriplegia    2. Contracture of joint of lower leg    3. Spastic cerebral palsy, congenital    4. Administrative encounter        Plan:   1. Spastic quadriplegia  Comments:  Chronic stable  Overview:  Patient in bed with eyes open.  His mother gave history.  Per mom, patient has been doing well, has not been ill since his last visit, and they have been following up with GI and his 90 L does go to GI.  He would need lab work for his 90 L this year. Transportation is a problem.  Patient is bed-bound and they have not been able to get an electric wheelchair, insurance has denied it but allowed for a wagon and patient is not comfortable in it. We will send home health out to the house for blood drawn-CBC BMP and UA.  He has care giver from 9-2pm daily.      Orders:  -     Ambulatory referral/consult to Home Health; Future; Expected date: 05/07/2024    2. Contracture of joint of lower leg  Comments:  Chronic stable  Orders:  -     Ambulatory referral/consult to Home Health; Future; Expected date: 05/07/2024    3. Spastic cerebral palsy, congenital  Comments:  Chronic stable  Orders:  -     Ambulatory referral/consult to Home Health; Future; Expected date:  05/07/2024    4. Administrative encounter  Comments:  Mom needs a letter to stating that she is the primary caregiver to her son to be excuse for jury duty.        I have reviewed all of the patient's clinical history available in care everywhere and Epic and have utilized this in my evaluation and management recommendations today.      Treatment options and alternatives were discussed with the patient. Patient was given ample time to ask questions. All questions were answered. Voices understanding and acceptance of this advice. Will call back if any further questions or concerns.     Portions of the record may have been created with voice recognition software. Occasional wrong-word or sound-a-like substitutions may have occurred due to the inherent limitations of voice recognition software. Read the chart carefully and recognize, using context, where substitutions have occurred.               Fela Armas MD  [facility],